# Patient Record
Sex: FEMALE | Race: WHITE | Employment: UNEMPLOYED | RURAL
[De-identification: names, ages, dates, MRNs, and addresses within clinical notes are randomized per-mention and may not be internally consistent; named-entity substitution may affect disease eponyms.]

---

## 2022-03-07 ENCOUNTER — OFFICE VISIT (OUTPATIENT)
Dept: FAMILY MEDICINE CLINIC | Age: 42
End: 2022-03-07
Payer: MEDICAID

## 2022-03-07 VITALS
DIASTOLIC BLOOD PRESSURE: 82 MMHG | HEIGHT: 67 IN | RESPIRATION RATE: 16 BRPM | SYSTOLIC BLOOD PRESSURE: 122 MMHG | HEART RATE: 64 BPM | BODY MASS INDEX: 31.92 KG/M2 | WEIGHT: 203.4 LBS | TEMPERATURE: 97.1 F

## 2022-03-07 DIAGNOSIS — E07.9 THYROID DISEASE: Primary | ICD-10-CM

## 2022-03-07 DIAGNOSIS — E55.9 VITAMIN D DEFICIENCY: ICD-10-CM

## 2022-03-07 DIAGNOSIS — B37.9 YEAST INFECTION: ICD-10-CM

## 2022-03-07 PROBLEM — K21.9 GASTROESOPHAGEAL REFLUX DISEASE WITHOUT ESOPHAGITIS: Status: ACTIVE | Noted: 2022-03-07

## 2022-03-07 PROCEDURE — 99203 OFFICE O/P NEW LOW 30 MIN: CPT | Performed by: FAMILY MEDICINE

## 2022-03-07 RX ORDER — FLUCONAZOLE 150 MG/1
150 TABLET ORAL DAILY
Qty: 1 TABLET | Refills: 0 | Status: SHIPPED | OUTPATIENT
Start: 2022-03-07 | End: 2022-03-08

## 2022-03-07 NOTE — PATIENT INSTRUCTIONS
Learning About Low-Carbohydrate Diets  What is a low-carbohydrate diet? A low-carbohydrate (or \"low-carb\") diet limits foods and drinks that have carbohydrates. This includes grains, fruits, milk and yogurt, and starchy vegetables like potatoes, beans, and corn. It also avoids foods and drinks that have added sugar. Instead, low-carb diets include foods that are high in protein and fat. Why might you follow a low-carb diet? Low-carb diets may be used for a variety of reasons, such as for weight loss. People who have diabetes may use a low-carb diet to help manage their blood sugar levels. What should you do before you start the diet? Talk to your doctor before you try any diet. This is even more important if you have health problems like kidney disease, heart disease, or diabetes. Your doctor may suggest that you meet with a registered dietitian. A dietitian can help you make an eating plan that works for you. What foods do you eat on a low-carb diet? On a low-carb diet, you choose foods that are high in protein and fat. Examples of these are:  · Meat, poultry, and fish. · Eggs. · Nuts, such as walnuts, pecans, almonds, and peanuts. · Peanut butter and other nut butters. · Tofu. · Avocado. · Erica Sidell. · Non-starchy vegetables like broccoli, cauliflower, green beans, mushrooms, peppers, lettuce, and spinach. · Unsweetened non-dairy milks like almond milk and coconut milk. · Cheese, cottage cheese, and cream cheese. Current as of: December 17, 2020               Content Version: 13.0  © 2006-2021 [employer]. Care instructions adapted under license by [employer] (which disclaims liability or warranty for this information). If you have questions about a medical condition or this instruction, always ask your healthcare professional. [place] [address] any warranty or liability for your use of this information.

## 2022-03-07 NOTE — PROGRESS NOTES
Chief Complaint   Patient presents with   [address]     3 most recent PHQ Screens 3/7/2022   Little interest or pleasure in doing things Not at all   Feeling down, depressed, irritable, or hopeless Not at all   Total Score PHQ 2 0     Abuse Screening Questionnaire 3/7/2022   Do you ever feel afraid of your partner? N   Are you in a relationship with someone who physically or mentally threatens you? N   Is it safe for you to go home? Y     Visit Vitals  /82 (BP 1 Location: Left arm, BP Patient Position: Sitting, BP Cuff Size: Small adult)   Pulse 64   Temp 97.1 °F (36.2 °C) (Oral)   Resp 16   Ht 5' 7\" (1.702 m)   Wt 203 lb 6.4 oz (92.3 kg)   BMI 31.86 kg/m²     1. \"Have you been to the ER, urgent care clinic since your last visit? Hospitalized since your last visit? \" 11/21 [address] for UTI    2. \"Have you seen or consulted any other health care providers outside of the [address] [place] [address] since your last visit? \" no    3. For patients aged 39-70: Has the patient had a colonoscopy / FIT/ Cologuard?no      If the patient is female:    4. For patients aged 41-77: Has the patient had a mammogram within the past 2 years? Paulette Phan      5. For patients aged 21-65: Has the patient had a pap smear?  GYN Pap Done

## 2022-03-08 ENCOUNTER — TELEPHONE (OUTPATIENT)
Dept: FAMILY MEDICINE CLINIC | Age: 42
End: 2022-03-08

## 2022-03-08 LAB
25(OH)D3+25(OH)D2 SERPL-MCNC: 22.2 NG/ML (ref 30–100)
T4 FREE SERPL-MCNC: 1.16 NG/DL (ref 0.82–1.77)
TSH SERPL DL<=0.005 MIU/L-ACNC: 0.69 UIU/ML (ref 0.45–4.5)

## 2022-03-08 NOTE — PROGRESS NOTES
HISTORY OF PRESENT ILLNESS  Demarcus Hester is a 38 y.o. female. HPI  New pt presents to establish care. Hx of thyroid nodules. Years ago she was treated with thyroid hormone replacement but she was able to get off med after pregnancy. Due for US thyroid. Pt had ER visit @ [address] [place] [address] Str. last 2021 for vaginal discharge. Had labs and CT scan. She has some recurrent vaginal itching. ROS  Past Medical History:   Diagnosis Date    Thyroid disease     hypo     Past Surgical History:   Procedure Laterality Date    HX  SECTION  2009    twins    HX TUBAL LIGATION       Current Outpatient Medications   Medication Sig Dispense Refill    fluconazole (DIFLUCAN) 150 mg tablet Take 1 Tablet by mouth daily for 1 day. FDA advises cautious prescribing of oral fluconazole in pregnancy. 1 Tablet 0     Social History     Socioeconomic History    Marital status:      Spouse name: Not on file    Number of children: Not on file    Years of education: Not on file    Highest education level: Not on file   Occupational History    Not on file   Tobacco Use    Smoking status: Never Smoker    Smokeless tobacco: Never Used   Vaping Use    Vaping Use: Never used   Substance and Sexual Activity    Alcohol use: Never    Drug use: Never    Sexual activity: Yes     Birth control/protection: Surgical   Other Topics Concern    Not on file   Social History Narrative    Not on file     Social Determinants of Health     Financial Resource Strain:     Difficulty of Paying Living Expenses: Not on file   Food Insecurity:     Worried About Running Out of Food in the Last Year: Not on file    [place] of Food in the Last Year: Not on file   Transportation Needs:     Lack of Transportation (Medical): Not on file    Lack of Transportation (Non-Medical):  Not on file   Physical Activity:     Days of Exercise per Week: Not on file    Minutes of Exercise per Session: Not on file   Stress:     Feeling of Stress : Not on file   Social Connections:     Frequency of Communication with Friends and Family: Not on file    Frequency of Social Gatherings with Friends and Family: Not on file    Attends Sikh Services: Not on file    Active Member of Clubs or Organizations: Not on file    Attends Club or Organization Meetings: Not on file    Marital Status: Not on file   Intimate Partner Violence:     Fear of Current or Ex-Partner: Not on file    Emotionally Abused: Not on file    Physically Abused: Not on file    Sexually Abused: Not on file   Housing Stability:     Unable to Pay for Housing in the Last Year: Not on file    Number of [place] in the Last Year: Not on file    Unstable Housing in the Last Year: Not on file     Family History   Problem Relation Age of Onset    Diabetes Mother     Heart Disease Mother         CAD    Hypertension Mother     Cancer Father         prostate    Other Father         CLL    Hypertension Brother      Visit Vitals  /82 (BP 1 Location: Left arm, BP Patient Position: Sitting, BP Cuff Size: Small adult)   Pulse 64   Temp 97.1 °F (36.2 °C) (Oral)   Resp 16   Ht 5' 7\" (1.702 m)   Wt 203 lb 6.4 oz (92.3 kg)   LMP 02/21/2022   BMI 31.86 kg/m²       Physical Exam  Vitals reviewed. Constitutional:       Appearance: Normal appearance. HENT:      Head: Normocephalic. Cardiovascular:      Rate and Rhythm: Normal rate and regular rhythm. Heart sounds: Normal heart sounds. Pulmonary:      Effort: Pulmonary effort is normal.      Breath sounds: Normal breath sounds. Musculoskeletal:      Cervical back: Neck supple. No tenderness. Neurological:      Mental Status: She is alert. ASSESSMENT and PLAN    ICD-10-CM ICD-9-CM    1. Thyroid disease  E07.9 246.9 US THYROID/PARATHYROID/SOFT TISS      TSH 3RD GENERATION      T4, FREE      TSH 3RD GENERATION      T4, FREE   2. Yeast infection  B37.9 112.9 fluconazole (DIFLUCAN) 150 mg tablet   3.  Vitamin D deficiency E55.9 268.9 VITAMIN D, 25 HYDROXY      VITAMIN D, 25 HYDROXY     Request ER records from [facility] (Nov 2021)  Check thyroid labs.   Order US thyroid

## 2022-03-08 NOTE — TELEPHONE ENCOUNTER
----- Message from Juli Mallory sent at 3/8/2022  9:56 AM EST -----  Subject: Message to Provider    QUESTIONS  Information for Provider? Pt called and said that the imaging of her   thyroid was completed at the [facility]   [phone]. She wanted to know if the office could request those   records. ---------------------------------------------------------------------------  --------------  Lina Fearing INFO  What is the best way for the office to contact you? OK to leave message on   voicemail  Preferred Call Back Phone Number? [phone]  ---------------------------------------------------------------------------  --------------  SCRIPT ANSWERS  Relationship to Patient?  Self

## 2022-03-08 NOTE — TELEPHONE ENCOUNTER
Spoke to patient let her know she needs to reach out to Imaging office in [place] and have them faxed to our office.

## 2022-03-10 ENCOUNTER — HOSPITAL ENCOUNTER (OUTPATIENT)
Dept: ULTRASOUND IMAGING | Age: 42
Discharge: HOME | End: 2022-03-10
Attending: FAMILY MEDICINE
Payer: MEDICAID

## 2022-03-10 ENCOUNTER — TELEPHONE (OUTPATIENT)
Dept: FAMILY MEDICINE CLINIC | Age: 42
End: 2022-03-10

## 2022-03-10 DIAGNOSIS — E07.9 THYROID DISEASE: ICD-10-CM

## 2022-03-10 PROCEDURE — 76536 US EXAM OF HEAD AND NECK: CPT

## 2022-03-10 NOTE — TELEPHONE ENCOUNTER
[facility] said that pt has not been seen there. Called pt to check where her last PAP and mammogram were done. Lvm for her to call back with the information.

## 2022-03-13 NOTE — PROGRESS NOTES
Thyroid nodules do not look worrisome. We were able to get record of thyroid ultrasound from previous clinic.

## 2022-03-19 PROBLEM — K21.9 GASTROESOPHAGEAL REFLUX DISEASE WITHOUT ESOPHAGITIS: Status: ACTIVE | Noted: 2022-03-07

## 2022-04-04 ENCOUNTER — TELEPHONE (OUTPATIENT)
Dept: FAMILY MEDICINE CLINIC | Age: 42
End: 2022-04-04

## 2022-04-04 DIAGNOSIS — R53.83 FATIGUE, UNSPECIFIED TYPE: Primary | ICD-10-CM

## 2022-04-04 NOTE — TELEPHONE ENCOUNTER
----- Message from Darin Daniels sent at 4/4/2022  9:22 AM EDT -----  Subject: Message to Provider    QUESTIONS  Information for Provider? patient would like some lab orders put in for   her please. Please let patient know when this is done. Thanks  ---------------------------------------------------------------------------  --------------  Ruy SAMUEL  What is the best way for the office to contact you? OK to leave message on   voicemail  Preferred Call Back Phone Number? [phone]  ---------------------------------------------------------------------------  --------------  SCRIPT ANSWERS  Relationship to Patient?  Self

## 2022-04-04 NOTE — TELEPHONE ENCOUNTER
Spoke to patient. Patient is wanting a CBC or BMP ordered because she is tired with white gums and feels like she may be anemic. I explained that Dr. Maryam Maddox is on vacation and it may be a while before we get back to her and she understood. Thank you.

## 2022-04-06 NOTE — TELEPHONE ENCOUNTER
I placed lab orders. Please call pt to set lab appt. Her Vit D was low last month and that can cause fatigue. I had advised to take Vit D supplement.

## 2022-04-12 ENCOUNTER — APPOINTMENT (OUTPATIENT)
Dept: FAMILY MEDICINE CLINIC | Age: 42
End: 2022-04-12

## 2022-04-13 LAB
25(OH)D3+25(OH)D2 SERPL-MCNC: 36.7 NG/ML (ref 30–100)
ALBUMIN SERPL-MCNC: 4.9 G/DL (ref 3.8–4.8)
ALBUMIN/GLOB SERPL: 1.7 {RATIO} (ref 1.2–2.2)
ALP SERPL-CCNC: 80 IU/L (ref 44–121)
ALT SERPL-CCNC: 11 IU/L (ref 0–32)
AST SERPL-CCNC: 12 IU/L (ref 0–40)
BASOPHILS # BLD AUTO: 0 X10E3/UL (ref 0–0.2)
BASOPHILS NFR BLD AUTO: 1 %
BILIRUB SERPL-MCNC: 0.7 MG/DL (ref 0–1.2)
BUN SERPL-MCNC: 12 MG/DL (ref 6–24)
BUN/CREAT SERPL: 16 (ref 9–23)
CALCIUM SERPL-MCNC: 9 MG/DL (ref 8.7–10.2)
CHLORIDE SERPL-SCNC: 103 MMOL/L (ref 96–106)
CO2 SERPL-SCNC: 23 MMOL/L (ref 20–29)
CREAT SERPL-MCNC: 0.77 MG/DL (ref 0.57–1)
EGFR: 97 ML/MIN/1.73
EOSINOPHIL # BLD AUTO: 0 X10E3/UL (ref 0–0.4)
EOSINOPHIL NFR BLD AUTO: 1 %
ERYTHROCYTE [DISTWIDTH] IN BLOOD BY AUTOMATED COUNT: 12.3 % (ref 11.7–15.4)
GLOBULIN SER CALC-MCNC: 2.9 G/DL (ref 1.5–4.5)
GLUCOSE SERPL-MCNC: 81 MG/DL (ref 65–99)
HCT VFR BLD AUTO: 42.9 % (ref 34–46.6)
HGB BLD-MCNC: 14.1 G/DL (ref 11.1–15.9)
IMM GRANULOCYTES # BLD AUTO: 0 X10E3/UL (ref 0–0.1)
IMM GRANULOCYTES NFR BLD AUTO: 0 %
LYMPHOCYTES # BLD AUTO: 1.6 X10E3/UL (ref 0.7–3.1)
LYMPHOCYTES NFR BLD AUTO: 30 %
MCH RBC QN AUTO: 29 PG (ref 26.6–33)
MCHC RBC AUTO-ENTMCNC: 32.9 G/DL (ref 31.5–35.7)
MCV RBC AUTO: 88 FL (ref 79–97)
MONOCYTES # BLD AUTO: 0.4 X10E3/UL (ref 0.1–0.9)
MONOCYTES NFR BLD AUTO: 8 %
NEUTROPHILS # BLD AUTO: 3.3 X10E3/UL (ref 1.4–7)
NEUTROPHILS NFR BLD AUTO: 60 %
PLATELET # BLD AUTO: 292 X10E3/UL (ref 150–450)
POTASSIUM SERPL-SCNC: 4.2 MMOL/L (ref 3.5–5.2)
PROT SERPL-MCNC: 7.8 G/DL (ref 6–8.5)
RBC # BLD AUTO: 4.87 X10E6/UL (ref 3.77–5.28)
SODIUM SERPL-SCNC: 143 MMOL/L (ref 134–144)
WBC # BLD AUTO: 5.5 X10E3/UL (ref 3.4–10.8)

## 2022-04-15 NOTE — TELEPHONE ENCOUNTER
Labs show normal complete blood cell counts. Normal sugar, electrolytes, liver, and kidney function. Vit D level is at goal.  Keep up the good work!

## 2022-06-29 ENCOUNTER — OFFICE VISIT (OUTPATIENT)
Dept: FAMILY MEDICINE CLINIC | Age: 42
End: 2022-06-29
Payer: COMMERCIAL

## 2022-06-29 VITALS
HEART RATE: 77 BPM | RESPIRATION RATE: 20 BRPM | SYSTOLIC BLOOD PRESSURE: 110 MMHG | OXYGEN SATURATION: 100 % | WEIGHT: 239 LBS | DIASTOLIC BLOOD PRESSURE: 80 MMHG | BODY MASS INDEX: 34.22 KG/M2 | TEMPERATURE: 98.5 F | HEIGHT: 70 IN

## 2022-06-29 DIAGNOSIS — F32.9 DEPRESSION, REACTIVE: ICD-10-CM

## 2022-06-29 DIAGNOSIS — Z63.0 STRESS DUE TO MARITAL PROBLEMS: ICD-10-CM
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
## 2022-06-29 DIAGNOSIS — R11.0 NAUSEA: Primary | ICD-10-CM

## 2022-06-29 PROCEDURE — 99214 OFFICE O/P EST MOD 30 MIN: CPT | Performed by: FAMILY MEDICINE

## 2022-06-29 RX ORDER — FAMOTIDINE 40 MG/1
40 TABLET, FILM COATED ORAL DAILY
Qty: 30 TABLET | Refills: 0 | Status: SHIPPED | OUTPATIENT
Start: 2022-06-29 | End: 2022-08-12

## 2022-06-29 RX ORDER — ASPIRIN 325 MG
TABLET, DELAYED RELEASE (ENTERIC COATED) ORAL
COMMUNITY
Start: 2022-05-19

## 2022-06-29 SDOH — SOCIAL STABILITY - SOCIAL INSECURITY: PROBLEMS IN RELATIONSHIP WITH SPOUSE OR PARTNER: Z63.0

## 2022-06-29 NOTE — PATIENT INSTRUCTIONS
Exposure to Blood and Body Fluids: Care Instructions  Your Care Instructions     When you are caring for another person, there's always a chance that you might be exposed to the person's body fluids, such as blood, saliva, urine, or vomit. This can happen if you're accidently stuck by a needle or if body fluids splash into an open cut, or into your mouth, nose, or eyes. You can also be exposed if someone coughs or sneezes near your mouth or eyes. When something like this happens, it can be scary. The biggest concern is getting a disease. You may need repeated tests to check for hepatitis B, hepatitis C, and HIV infection. You may need other tests too. The first tests may not show any infection, but your doctor will need them to compare with later tests. Be sure to talk with your doctor about follow-up tests. You may need tests at 6 weeks, 3 months, 6 months, and possibly at 9 months. It takes this long for some diseases to show up on tests. Follow-up care is a key part of your treatment and safety. Be sure to make and go to all appointments, and call your doctor if you are having problems. It's also a good idea to know your test results and keep a list of the medicines you take. How can you care for yourself at home? · Clean the area as instructed by your doctor. · Be safe with medicines. If your doctor prescribed medicine to protect you from disease, make sure you take all the medicine as directed. Call your doctor if you think you are having a problem with your medicine. When should you call for help? Watch closely for changes in your health, and be sure to contact your doctor if:    · You have symptoms of infection, such as:  ? Increased pain, swelling, warmth or redness. ? Red streaks leading from the area. ? Pus draining from the area. ? A fever.     · You have new symptoms, such as belly pain or fatigue. Where can you learn more?   Go to http://www.gray.com/  Enter C142 in the search box to learn more about \"Exposure to Blood and Body Fluids: Care Instructions. \"  Current as of: July 1, 2021               Content Version: 13.2  © 8096-4992 [employer]. Care instructions adapted under license by [employer] (which disclaims liability or warranty for this information). If you have questions about a medical condition or this instruction, always ask your healthcare professional. [place] [address] any warranty or liability for your use of this information.

## 2022-06-29 NOTE — PROGRESS NOTES
Identified pt with two pt identifiers(name and ). Chief Complaint   Patient presents with    Nausea    Stress        Health Maintenance Due   Topic    Hepatitis C Screening     COVID-19 Vaccine (1)    DTaP/Tdap/Td series (1 - Tdap)    Cervical cancer screen     Lipid Screen        Wt Readings from Last 3 Encounters:   22 239 lb (108.4 kg)   22 203 lb 6.4 oz (92.3 kg)     Temp Readings from Last 3 Encounters:   22 98.5 °F (36.9 °C) (Temporal)   22 97.1 °F (36.2 °C) (Oral)     BP Readings from Last 3 Encounters:   22 110/80   22 122/82     Pulse Readings from Last 3 Encounters:   22 77   22 64         Learning Assessment:  :     Learning Assessment 3/7/2022   PRIMARY LEARNER Patient   CO-LEARNER CAREGIVER No   PRIMARY LANGUAGE ENGLISH   LEARNER PREFERENCE PRIMARY LISTENING   ANSWERED BY patient   RELATIONSHIP SELF       Depression Screening:  :     3 most recent PHQ Screens 2022   Little interest or pleasure in doing things Not at all   Feeling down, depressed, irritable, or hopeless Not at all   Total Score PHQ 2 0       Fall Risk Assessment:  :     No flowsheet data found. Abuse Screening:  :     Abuse Screening Questionnaire 2022 3/7/2022   Do you ever feel afraid of your partner? N N   Are you in a relationship with someone who physically or mentally threatens you? N N   Is it safe for you to go home? Y Y       Coordination of Care Questionnaire:  :     1) Have you been to an emergency room, urgent care clinic since your last visit? no   Hospitalized since your last visit? no             2) Have you seen or consulted any other health care providers outside of [address] [place] [address] since your last visit? no  (Include any pap smears or colon screenings in this section.)    3) Do you have an Advance Directive on file?  no  Are you interested in receiving information about Advance Directives? no    Patient is accompanied by N/A I have received verbal consent from [facility] A[facility] REA to discuss any/all medical information while they are present in the room. 4.  For patients aged 39-70: Has the patient had a colonoscopy / FIT/ Cologuard? NA - based on age      If the patient is female:    11. For patients aged 41-77: Has the patient had a mammogram within the past 2 years? Yes - no Care Gap present      6. For patients aged 21-65: Has the patient had a pap smear?  Yes - no Care Gap present

## 2022-06-29 NOTE — PROGRESS NOTES
HISTORY OF PRESENT ILLNESS  Bridgett Ordaz is a 38 y.o. female. HPI  C/O  \"nervous stomach\" since early June. Off and on. Feels nausea. No vomiting. Appetite is off. Hx IBS. No pain. Normal stools. If gets better when she is not under stress, such as when she took kids to beach for few days. Under a lot of stress. Having marital problems. This is affecting her work. Spoke with manager. Last worked June 9, 2022. She was crying between patients. She was working at [employer]. She needs [employer] papers filled out. Would like to work only 2-3 days per week due to stress. She is not interested in medical therapy at this time for anxiety. Will consider therapy. 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 Unable to track down patient involved. Now she is anxious about this and would like labs run. Review of Systems   Gastrointestinal: Positive for nausea. Negative for abdominal pain, constipation and diarrhea. Psychiatric/Behavioral: Positive for depression. The patient is nervous/anxious. Visit Vitals  /80 (BP 1 Location: Left arm, BP Patient Position: Sitting, BP Cuff Size: Adult)   Pulse 77   Temp 98.5 °F (36.9 °C) (Temporal)   Resp 20   Ht 5' 10\" (1.778 m)   Wt 239 lb (108.4 kg)   LMP 06/21/2022   SpO2 100%   BMI 34.29 kg/m²       Physical Exam  Vitals reviewed. Constitutional:       Appearance: She is obese. Neurological:      Mental Status: She is alert. Psychiatric:         Attention and Perception: Attention normal.         Mood and Affect: Mood is depressed. Speech: Speech normal.         Behavior: Behavior normal.         Thought Content: Thought content normal.         ASSESSMENT and PLAN    ICD-10-CM ICD-9-CM    1. Nausea  R11.0 787.02 famotidine (PEPCID) 40 mg tablet   2.  Depression, reactive  F32.9 300.4 REFERRAL TO BEHAVIORAL HEALTH   3. Stress due to marital problems  Z63.0 V61.10 REFERRAL TO BEHAVIORAL HEALTH   4. Needle stick, hypodermic, accidental, subsequent encounter  O29. 0XXD V58.89 HEPATITIS PANEL, ACUTE      HIV 1/2 AG/AB, 4TH GENERATION,W RFLX CONFIRM      HEPATITIS PANEL, ACUTE      HIV 1/2 AG/AB, 4TH GENERATION,W RFLX CONFIRM     Trial Pepcid 40 mg daily. Refer for counseling - call [facility] [employer]. Order labs:  HIV, Hepatitis screen  She will call HR to send FMLA forms sent here for completion. Plan to stay off work 4-6 weeks.   FU 1 month

## 2022-07-01 ENCOUNTER — APPOINTMENT (OUTPATIENT)
Dept: FAMILY MEDICINE CLINIC | Age: 42
End: 2022-07-01

## 2022-07-02 LAB
HAV IGM SERPL QL IA: NEGATIVE
HBV CORE IGM SERPL QL IA: NEGATIVE
HBV SURFACE AG SERPL QL IA: NEGATIVE
HCV AB S/CO SERPL IA: 0.1 S/CO RATIO (ref 0–0.9)
HCV AB SERPL QL IA: NORMAL
HIV 1+2 AB+HIV1 P24 AG SERPL QL IA: NON REACTIVE

## 2022-07-05 ENCOUNTER — TELEPHONE (OUTPATIENT)
Dept: FAMILY MEDICINE CLINIC | Age: 42
End: 2022-07-05

## 2022-07-06 NOTE — TELEPHONE ENCOUNTER
Call pt re: [facility] Accommodation Request Form. This is different from [facility] form we talked about. Has she found counselor? Also need FU appt end of July.

## 2022-07-08 NOTE — TELEPHONE ENCOUNTER
I spoke with pt. Her manager told her she was not eligible for FMLA since not worked for a year. Form sent to me is for ADA accomodation. Pt has gone back to work, afraid to lose job. She does not want me to fax the [facility] accomodation request form. She has applied for job transfer and will work only 2-3 days a week.

## 2022-08-11 DIAGNOSIS — R11.0 NAUSEA: ICD-10-CM

## 2022-08-12 RX ORDER — FAMOTIDINE 40 MG/1
40 TABLET, FILM COATED ORAL DAILY
Qty: 30 TABLET | Refills: 2 | Status: SHIPPED | OUTPATIENT
Start: 2022-08-12

## 2022-12-19 ENCOUNTER — NURSE TRIAGE (OUTPATIENT)
Dept: OTHER | Facility: CLINIC | Age: 42
End: 2022-12-19

## 2023-01-23 ENCOUNTER — TRANSCRIBE ORDER (OUTPATIENT)
Dept: SCHEDULING | Age: 43
End: 2023-01-23

## 2023-01-23 DIAGNOSIS — M54.50 LUMBAGO: ICD-10-CM

## 2023-01-23 DIAGNOSIS — M54.2 CERVICALGIA: Primary | ICD-10-CM

## 2023-02-09 ENCOUNTER — HOSPITAL ENCOUNTER (OUTPATIENT)
Dept: MRI IMAGING | Age: 43
End: 2023-02-09
Attending: ORTHOPAEDIC SURGERY
Payer: COMMERCIAL

## 2023-02-09 ENCOUNTER — HOSPITAL ENCOUNTER (OUTPATIENT)
Dept: MRI IMAGING | Age: 43
Discharge: HOME | End: 2023-02-09
Attending: ORTHOPAEDIC SURGERY
Payer: COMMERCIAL

## 2023-02-09 DIAGNOSIS — M54.50 LUMBAGO: ICD-10-CM

## 2023-02-09 PROCEDURE — 72148 MRI LUMBAR SPINE W/O DYE: CPT

## 2023-04-22 DIAGNOSIS — M54.2 CERVICALGIA: Primary | ICD-10-CM

## 2024-02-21 ENCOUNTER — OFFICE VISIT (OUTPATIENT)
Age: 44
End: 2024-02-21
Payer: MEDICAID

## 2024-02-21 VITALS
HEIGHT: 67 IN | HEART RATE: 66 BPM | DIASTOLIC BLOOD PRESSURE: 62 MMHG | WEIGHT: 179 LBS | BODY MASS INDEX: 28.09 KG/M2 | SYSTOLIC BLOOD PRESSURE: 110 MMHG | RESPIRATION RATE: 16 BRPM | OXYGEN SATURATION: 99 % | TEMPERATURE: 97.6 F

## 2024-02-21 DIAGNOSIS — E04.2 MULTIPLE THYROID NODULES: ICD-10-CM

## 2024-02-21 DIAGNOSIS — Z13.220 SCREENING, LIPID: ICD-10-CM

## 2024-02-21 DIAGNOSIS — Z12.31 ENCOUNTER FOR SCREENING MAMMOGRAM FOR MALIGNANT NEOPLASM OF BREAST: ICD-10-CM

## 2024-02-21 DIAGNOSIS — Z01.419 WELL FEMALE EXAM WITH ROUTINE GYNECOLOGICAL EXAM: ICD-10-CM

## 2024-02-21 DIAGNOSIS — E55.9 VITAMIN D DEFICIENCY: ICD-10-CM

## 2024-02-21 DIAGNOSIS — E07.9 THYROID DISEASE: ICD-10-CM

## 2024-02-21 DIAGNOSIS — Z79.899 ENCOUNTER FOR LONG-TERM (CURRENT) USE OF MEDICATIONS: ICD-10-CM

## 2024-02-21 DIAGNOSIS — Z12.83 SKIN CANCER SCREENING: ICD-10-CM

## 2024-02-21 DIAGNOSIS — Z00.00 ENCOUNTER FOR WELL ADULT EXAM WITHOUT ABNORMAL FINDINGS: Primary | ICD-10-CM

## 2024-02-21 DIAGNOSIS — Z12.11 SCREEN FOR COLON CANCER: ICD-10-CM

## 2024-02-21 DIAGNOSIS — K21.9 GASTROESOPHAGEAL REFLUX DISEASE WITHOUT ESOPHAGITIS: ICD-10-CM

## 2024-02-21 PROCEDURE — 99214 OFFICE O/P EST MOD 30 MIN: CPT | Performed by: FAMILY MEDICINE

## 2024-02-21 RX ORDER — FAMOTIDINE 40 MG/1
40 TABLET, FILM COATED ORAL 2 TIMES DAILY
Qty: 60 TABLET | Refills: 5 | Status: SHIPPED | OUTPATIENT
Start: 2024-02-21

## 2024-02-21 RX ORDER — PANTOPRAZOLE SODIUM 40 MG/1
40 TABLET, DELAYED RELEASE ORAL DAILY
COMMUNITY
End: 2024-02-21

## 2024-02-21 SDOH — ECONOMIC STABILITY: HOUSING INSECURITY
IN THE LAST 12 MONTHS, WAS THERE A TIME WHEN YOU DID NOT HAVE A STEADY PLACE TO SLEEP OR SLEPT IN A SHELTER (INCLUDING NOW)?: NO

## 2024-02-21 SDOH — ECONOMIC STABILITY: FOOD INSECURITY: WITHIN THE PAST 12 MONTHS, THE FOOD YOU BOUGHT JUST DIDN'T LAST AND YOU DIDN'T HAVE MONEY TO GET MORE.: NEVER TRUE

## 2024-02-21 SDOH — ECONOMIC STABILITY: INCOME INSECURITY: HOW HARD IS IT FOR YOU TO PAY FOR THE VERY BASICS LIKE FOOD, HOUSING, MEDICAL CARE, AND HEATING?: NOT HARD AT ALL

## 2024-02-21 SDOH — ECONOMIC STABILITY: FOOD INSECURITY: WITHIN THE PAST 12 MONTHS, YOU WORRIED THAT YOUR FOOD WOULD RUN OUT BEFORE YOU GOT MONEY TO BUY MORE.: NEVER TRUE

## 2024-02-21 ASSESSMENT — PATIENT HEALTH QUESTIONNAIRE - PHQ9
SUM OF ALL RESPONSES TO PHQ QUESTIONS 1-9: 0
7. TROUBLE CONCENTRATING ON THINGS, SUCH AS READING THE NEWSPAPER OR WATCHING TELEVISION: 0
3. TROUBLE FALLING OR STAYING ASLEEP: 0
2. FEELING DOWN, DEPRESSED OR HOPELESS: 0
SUM OF ALL RESPONSES TO PHQ QUESTIONS 1-9: 0
SUM OF ALL RESPONSES TO PHQ9 QUESTIONS 1 & 2: 0
SUM OF ALL RESPONSES TO PHQ QUESTIONS 1-9: 0
6. FEELING BAD ABOUT YOURSELF - OR THAT YOU ARE A FAILURE OR HAVE LET YOURSELF OR YOUR FAMILY DOWN: 0
SUM OF ALL RESPONSES TO PHQ QUESTIONS 1-9: 0
8. MOVING OR SPEAKING SO SLOWLY THAT OTHER PEOPLE COULD HAVE NOTICED. OR THE OPPOSITE, BEING SO FIGETY OR RESTLESS THAT YOU HAVE BEEN MOVING AROUND A LOT MORE THAN USUAL: 0
4. FEELING TIRED OR HAVING LITTLE ENERGY: 0
9. THOUGHTS THAT YOU WOULD BE BETTER OFF DEAD, OR OF HURTING YOURSELF: 0
5. POOR APPETITE OR OVEREATING: 0
1. LITTLE INTEREST OR PLEASURE IN DOING THINGS: 0
10. IF YOU CHECKED OFF ANY PROBLEMS, HOW DIFFICULT HAVE THESE PROBLEMS MADE IT FOR YOU TO DO YOUR WORK, TAKE CARE OF THINGS AT HOME, OR GET ALONG WITH OTHER PEOPLE: 0

## 2024-02-21 NOTE — PROGRESS NOTES
Well Adult Note  Name: Poppy Lewis Today’s Date: 2024   MRN: [id number] Sex: Female   Age: 44 y.o. Ethnicity:  /    : 1980 Race: White (non-)      Poppy Lewis is here for well adult exam.  History:  Hx GERD and thyroid nodules. Due labs.      Review of Systems    No Known Allergies      Prior to Visit Medications    Medication Sig Taking? Authorizing Provider   famotidine (PEPCID) 40 MG tablet Take 1 tablet by mouth 2 times daily Yes Yessenia Mijares MD         Past Medical History:   Diagnosis Date    Thyroid disease     hypo       Past Surgical History:   Procedure Laterality Date     SECTION  2009    twins    TUBAL LIGATION  2009         Family History   Problem Relation Age of Onset    Hypertension Brother     Other Father         CLL    Cancer Father         prostate    Hypertension Mother     Heart Disease Mother         CAD    Diabetes Mother        Social History     Tobacco Use    Smoking status: Never    Smokeless tobacco: Never   Substance Use Topics    Alcohol use: Never    Drug use: Never       Objective     Vital Signs  /62 (Site: Left Upper Arm, Position: Sitting, Cuff Size: Large Adult)   Pulse 66   Temp 97.6 °F (36.4 °C) (Temporal)   Resp 16   Ht 1.702 m (5' 7\")   Wt 81.2 kg (179 lb)   LMP 2024 (Approximate)   SpO2 99%   BMI 28.04 kg/m²   Wt Readings from Last 3 Encounters:   24 81.2 kg (179 lb)   22 108.4 kg (239 lb)   22 92.3 kg (203 lb 6.4 oz)       Waist Circumference  There were no vitals filed for this visit.    Physical Exam  Vitals reviewed.   Constitutional:       Appearance: Normal appearance.   HENT:      Head: Normocephalic.      Right Ear: Tympanic membrane normal.      Left Ear: Tympanic membrane normal.      Mouth/Throat:      Mouth: Mucous membranes are moist.   Eyes:      Conjunctiva/sclera: Conjunctivae normal.   Cardiovascular:      Rate and Rhythm: Normal rate and regular rhythm.      Heart

## 2024-02-21 NOTE — PATIENT INSTRUCTIONS
are likely to live. Your doctor may be able to give you an idea of what usually happens with your specific illness.  Think about preparing papers that state your wishes. These papers are called advance directives. If you do this early and review them often, there will not be any confusion about what you want. You can change your instructions at any time.  Which papers should you prepare?  Advance directives are legal papers that tell doctors how you want to be cared for at the end of your life. You do not need a  to write these papers. Ask your doctor or your [facility] department for information on how to write your advance directives. They may have the forms for each of these types of papers. Make sure your doctor has a copy of these on file, and give a copy to a family member or close friend.  Consider a do-not-resuscitate order (DNR). This order asks that no extra treatments be done if your heart stops or you stop breathing. Extra treatments may include cardiopulmonary resuscitation (CPR), electrical shock to restart your heart, or a machine to breathe for you. If you decide to have a DNR order, ask your doctor to explain and write it. Place the order in your home where everyone can easily see it.  Consider a living will. A living will explains your wishes about life support and other treatments at the end of your life if you become unable to speak for yourself. Living cho tell doctors to use or not use treatments that would keep you alive. You must have one or two witnesses or a notary present when you sign this form. A living will may be called something else in your state.  Consider a medical power of . This form allows you to name a person to make decisions about your care if you are not able to. Most people ask a close friend or family member. Talk to this person about the kinds of treatments you want and those that you do not want. Make sure this person understands your wishes. A medical

## 2024-02-21 NOTE — PROGRESS NOTES
Identified pt with two pt identifiers(name and ).    Chief Complaint   Patient presents with    Annual Exam     No concerns    Referral - General     OBGYN        Health Maintenance Due   Topic    Hepatitis B vaccine (1 of 3 - 3-dose series)    DTaP/Tdap/Td vaccine (1 - Tdap)    Cervical cancer screen     Lipids     Colorectal Cancer Screen     Depression Monitoring     Flu vaccine (1)    COVID-19 Vaccine ( season)       Wt Readings from Last 3 Encounters:   24 81.2 kg (179 lb)   22 108.4 kg (239 lb)   22 92.3 kg (203 lb 6.4 oz)     Temp Readings from Last 3 Encounters:   24 97.6 °F (36.4 °C) (Temporal)     BP Readings from Last 3 Encounters:   24 110/62   22 110/80   22 122/82     Pulse Readings from Last 3 Encounters:   24 66   22 77   22 64           Depression Screening:  :         2024     3:18 PM 2022     4:00 PM   PHQ-9 Questionaire   Little interest or pleasure in doing things 0 0   Feeling down, depressed, or hopeless 0 0   Trouble falling or staying asleep, or sleeping too much 0    Feeling tired or having little energy 0    Poor appetite or overeating 0    Feeling bad about yourself - or that you are a failure or have let yourself or your family down 0    Trouble concentrating on things, such as reading the newspaper or watching television 0    Moving or speaking so slowly that other people could have noticed. Or the opposite - being so fidgety or restless that you have been moving around a lot more than usual 0    Thoughts that you would be better off dead, or of hurting yourself in some way 0    PHQ-9 Total Score 0 0   If you checked off any problems, how difficult have these problems made it for you to do your work, take care of things at home, or get along with other people? 0         Fall Risk Assessment:  :          No data to display                 Abuse Screening:  :          No data to display

## 2024-02-27 ENCOUNTER — TELEPHONE (OUTPATIENT)
Age: 44
End: 2024-02-27

## 2024-03-08 ENCOUNTER — HOSPITAL ENCOUNTER (OUTPATIENT)
Facility: HOSPITAL | Age: 44
End: 2024-03-08
Attending: FAMILY MEDICINE
Payer: MEDICAID

## 2024-03-08 VITALS — HEIGHT: 67 IN | BODY MASS INDEX: 28.09 KG/M2 | WEIGHT: 179 LBS

## 2024-03-08 DIAGNOSIS — Z12.31 ENCOUNTER FOR SCREENING MAMMOGRAM FOR MALIGNANT NEOPLASM OF BREAST: ICD-10-CM

## 2024-03-08 DIAGNOSIS — E04.2 MULTIPLE THYROID NODULES: ICD-10-CM

## 2024-03-08 PROCEDURE — 77063 BREAST TOMOSYNTHESIS BI: CPT

## 2024-03-08 PROCEDURE — 76536 US EXAM OF HEAD AND NECK: CPT

## 2024-03-09 LAB
25(OH)D3+25(OH)D2 SERPL-MCNC: 22.2 NG/ML (ref 30–100)
ALBUMIN SERPL-MCNC: 4.3 G/DL (ref 3.9–4.9)
ALBUMIN/GLOB SERPL: 1.8 {RATIO} (ref 1.2–2.2)
ALP SERPL-CCNC: 57 IU/L (ref 44–121)
ALT SERPL-CCNC: 22 IU/L (ref 0–32)
AST SERPL-CCNC: 22 IU/L (ref 0–40)
BASOPHILS # BLD AUTO: 0 X10E3/UL (ref 0–0.2)
BASOPHILS NFR BLD AUTO: 0 %
BILIRUB SERPL-MCNC: 0.9 MG/DL (ref 0–1.2)
BUN SERPL-MCNC: 11 MG/DL (ref 6–24)
BUN/CREAT SERPL: 14 (ref 9–23)
CALCIUM SERPL-MCNC: 9 MG/DL (ref 8.7–10.2)
CHLORIDE SERPL-SCNC: 103 MMOL/L (ref 96–106)
CHOLEST SERPL-MCNC: 167 MG/DL (ref 100–199)
CO2 SERPL-SCNC: 22 MMOL/L (ref 20–29)
CREAT SERPL-MCNC: 0.81 MG/DL (ref 0.57–1)
EGFRCR SERPLBLD CKD-EPI 2021: 91 ML/MIN/1.73
EOSINOPHIL # BLD AUTO: 0.1 X10E3/UL (ref 0–0.4)
EOSINOPHIL NFR BLD AUTO: 1 %
ERYTHROCYTE [DISTWIDTH] IN BLOOD BY AUTOMATED COUNT: 12.3 % (ref 11.7–15.4)
GLOBULIN SER CALC-MCNC: 2.4 G/DL (ref 1.5–4.5)
GLUCOSE SERPL-MCNC: 77 MG/DL (ref 70–99)
HCT VFR BLD AUTO: 36.7 % (ref 34–46.6)
HDLC SERPL-MCNC: 70 MG/DL
HGB BLD-MCNC: 12 G/DL (ref 11.1–15.9)
IMM GRANULOCYTES # BLD AUTO: 0 X10E3/UL (ref 0–0.1)
IMM GRANULOCYTES NFR BLD AUTO: 0 %
IMP & REVIEW OF LAB RESULTS: NORMAL
LDLC SERPL CALC-MCNC: 86 MG/DL (ref 0–99)
LYMPHOCYTES # BLD AUTO: 1.5 X10E3/UL (ref 0.7–3.1)
LYMPHOCYTES NFR BLD AUTO: 21 %
MCH RBC QN AUTO: 28.8 PG (ref 26.6–33)
MCHC RBC AUTO-ENTMCNC: 32.7 G/DL (ref 31.5–35.7)
MCV RBC AUTO: 88 FL (ref 79–97)
MONOCYTES # BLD AUTO: 0.4 X10E3/UL (ref 0.1–0.9)
MONOCYTES NFR BLD AUTO: 6 %
NEUTROPHILS # BLD AUTO: 5 X10E3/UL (ref 1.4–7)
NEUTROPHILS NFR BLD AUTO: 72 %
PLATELET # BLD AUTO: 280 X10E3/UL (ref 150–450)
POTASSIUM SERPL-SCNC: 4.2 MMOL/L (ref 3.5–5.2)
PROT SERPL-MCNC: 6.7 G/DL (ref 6–8.5)
RBC # BLD AUTO: 4.17 X10E6/UL (ref 3.77–5.28)
SODIUM SERPL-SCNC: 140 MMOL/L (ref 134–144)
T4 FREE SERPL-MCNC: 1.19 NG/DL (ref 0.82–1.77)
TRIGL SERPL-MCNC: 52 MG/DL (ref 0–149)
TSH SERPL DL<=0.005 MIU/L-ACNC: 0.75 UIU/ML (ref 0.45–4.5)
VLDLC SERPL CALC-MCNC: 11 MG/DL (ref 5–40)
WBC # BLD AUTO: 7 X10E3/UL (ref 3.4–10.8)

## 2024-03-11 ENCOUNTER — TELEPHONE (OUTPATIENT)
Age: 44
End: 2024-03-11

## 2024-03-11 NOTE — TELEPHONE ENCOUNTER
----- Message from Yessenia Mijares MD sent at 3/9/2024  9:07 PM EST -----  Normal mammogram report.

## 2024-03-12 NOTE — PROGRESS NOTES
Poppy Lewis is a 44 y.o. female returns for an annual exam     Chief Complaint   Patient presents with    Annual Exam    New Patient       Patient's last menstrual period was 03/09/2024 (exact date).  Her periods are moderate in flow and usually regular with a 26-32 day interval with 3-7 day duration.  She does not have dysmenorrhea.  Problems: no problems  Birth Control: tubal ligation and not S/A currently  Last Pap: 2022 was normal per pt  She does not have a history of ALICIA 2, 3 or cervical cancer.   Last Mammogram: 3/8/2024 negative  Last colonoscopy: 3/8/2024 negative      Examination chaperoned by Eden Lees LPN.

## 2024-03-14 ENCOUNTER — OFFICE VISIT (OUTPATIENT)
Age: 44
End: 2024-03-14
Payer: MEDICAID

## 2024-03-14 VITALS — SYSTOLIC BLOOD PRESSURE: 101 MMHG | BODY MASS INDEX: 27.91 KG/M2 | DIASTOLIC BLOOD PRESSURE: 64 MMHG | WEIGHT: 178.2 LBS

## 2024-03-14 DIAGNOSIS — Z11.51 SCREENING FOR HUMAN PAPILLOMAVIRUS (HPV): ICD-10-CM

## 2024-03-14 DIAGNOSIS — Z01.419 ENCOUNTER FOR GYNECOLOGICAL EXAMINATION: Primary | ICD-10-CM

## 2024-03-14 PROCEDURE — 99386 PREV VISIT NEW AGE 40-64: CPT | Performed by: OBSTETRICS & GYNECOLOGY

## 2024-03-14 NOTE — PROGRESS NOTES
Poppy Lewis is a ,  44 y.o. female White (non-) whose LMP was on 3/9/2024 who presents for her annual checkup. She is having no problems.    Menstrual status:    Her periods are moderate in flow, regular    She denies dysmenorrhea.      Contraception:    The current method of family planning is tubal ligation.    Sexual history:    She  reports that she is not currently sexually active. She reports using the following method of birth control/protection: Surgical.        Pap and Mammogram History:    Last Pap:  was normal per pt  She does not have a history of ALICAI 2, 3 or cervical cancer.   Last Mammogram: 3/8/2024 negative  Last colonoscopy: never         Breast Cancer History    She has a family history of breast cancer.    Family History   Problem Relation Age of Onset    Hypertension Mother     Heart Disease Mother         CAD    Diabetes Mother     Other Father         CLL    Cancer Father         prostate    Hypertension Brother     Breast Cancer Paternal Grandmother        Past Medical History:   Diagnosis Date    Thyroid disease     hypo     Past Surgical History:   Procedure Laterality Date     SECTION      twins    TUBAL LIGATION       Current Outpatient Medications   Medication Sig Dispense Refill    famotidine (PEPCID) 40 MG tablet Take 1 tablet by mouth 2 times daily 60 tablet 5     No current facility-administered medications for this visit.     Allergies: Patient has no known allergies.   Tobacco History:  reports that she has never smoked. She has never used smokeless tobacco.  Alcohol Abuse:  reports no history of alcohol use.  Drug Abuse:  reports no history of drug use.  Patient Active Problem List   Diagnosis    Thyroid disease    Gastroesophageal reflux disease without esophagitis         Review of Systems - History obtained from the patient  Constitutional: negative for weight loss, fever, night sweats  HEENT: negative for hearing loss, earache, congestion,

## 2024-03-20 LAB
CYTOLOGIST CVX/VAG CYTO: NORMAL
CYTOLOGY CVX/VAG DOC CYTO: NORMAL
CYTOLOGY CVX/VAG DOC THIN PREP: NORMAL
DX ICD CODE: NORMAL
HPV GENOTYPE REFLEX: NORMAL
HPV I/H RISK 4 DNA CVX QL PROBE+SIG AMP: NEGATIVE
Lab: NORMAL
Lab: NORMAL
OTHER STN SPEC: NORMAL
STAT OF ADQ CVX/VAG CYTO-IMP: NORMAL

## 2024-03-29 ENCOUNTER — TELEPHONE (OUTPATIENT)
Age: 44
End: 2024-03-29

## 2024-03-29 NOTE — TELEPHONE ENCOUNTER
----- Message from Yessenia Mijares MD sent at 3/28/2024  8:13 PM EDT -----  Normal mammogram report.

## 2025-01-22 ENCOUNTER — COMMUNITY OUTREACH (OUTPATIENT)
Age: 45
End: 2025-01-22

## 2025-04-18 ENCOUNTER — OFFICE VISIT (OUTPATIENT)
Age: 45
End: 2025-04-18

## 2025-04-18 ENCOUNTER — LAB (OUTPATIENT)
Age: 45
End: 2025-04-18

## 2025-04-18 VITALS
HEART RATE: 68 BPM | BODY MASS INDEX: 27.34 KG/M2 | DIASTOLIC BLOOD PRESSURE: 68 MMHG | OXYGEN SATURATION: 98 % | RESPIRATION RATE: 16 BRPM | SYSTOLIC BLOOD PRESSURE: 110 MMHG | TEMPERATURE: 98.5 F | HEIGHT: 67 IN | WEIGHT: 174.2 LBS

## 2025-04-18 DIAGNOSIS — R53.83 FATIGUE, UNSPECIFIED TYPE: ICD-10-CM

## 2025-04-18 DIAGNOSIS — Z11.3 SCREEN FOR STD (SEXUALLY TRANSMITTED DISEASE): ICD-10-CM

## 2025-04-18 DIAGNOSIS — Z11.4 SCREENING FOR HIV (HUMAN IMMUNODEFICIENCY VIRUS): ICD-10-CM

## 2025-04-18 DIAGNOSIS — Z11.59 ENCOUNTER FOR HEPATITIS C SCREENING TEST FOR LOW RISK PATIENT: ICD-10-CM

## 2025-04-18 DIAGNOSIS — Z12.11 SCREEN FOR COLON CANCER: ICD-10-CM

## 2025-04-18 DIAGNOSIS — Z12.31 ENCOUNTER FOR SCREENING MAMMOGRAM FOR BREAST CANCER: ICD-10-CM

## 2025-04-18 DIAGNOSIS — E55.9 VITAMIN D DEFICIENCY: ICD-10-CM

## 2025-04-18 DIAGNOSIS — E04.9 GOITER: ICD-10-CM

## 2025-04-18 DIAGNOSIS — Z00.00 ENCOUNTER FOR WELL ADULT EXAM WITHOUT ABNORMAL FINDINGS: Primary | ICD-10-CM

## 2025-04-18 DIAGNOSIS — H61.23 BILATERAL IMPACTED CERUMEN: ICD-10-CM

## 2025-04-18 DIAGNOSIS — Z00.00 ENCOUNTER FOR WELL ADULT EXAM WITHOUT ABNORMAL FINDINGS: ICD-10-CM

## 2025-04-18 SDOH — ECONOMIC STABILITY: FOOD INSECURITY: WITHIN THE PAST 12 MONTHS, YOU WORRIED THAT YOUR FOOD WOULD RUN OUT BEFORE YOU GOT MONEY TO BUY MORE.: NEVER TRUE

## 2025-04-18 SDOH — ECONOMIC STABILITY: FOOD INSECURITY: WITHIN THE PAST 12 MONTHS, THE FOOD YOU BOUGHT JUST DIDN'T LAST AND YOU DIDN'T HAVE MONEY TO GET MORE.: NEVER TRUE

## 2025-04-18 ASSESSMENT — PATIENT HEALTH QUESTIONNAIRE - PHQ9
9. THOUGHTS THAT YOU WOULD BE BETTER OFF DEAD, OR OF HURTING YOURSELF: NOT AT ALL
SUM OF ALL RESPONSES TO PHQ QUESTIONS 1-9: 0
5. POOR APPETITE OR OVEREATING: NOT AT ALL
SUM OF ALL RESPONSES TO PHQ QUESTIONS 1-9: 0
1. LITTLE INTEREST OR PLEASURE IN DOING THINGS: NOT AT ALL
10. IF YOU CHECKED OFF ANY PROBLEMS, HOW DIFFICULT HAVE THESE PROBLEMS MADE IT FOR YOU TO DO YOUR WORK, TAKE CARE OF THINGS AT HOME, OR GET ALONG WITH OTHER PEOPLE: NOT DIFFICULT AT ALL
3. TROUBLE FALLING OR STAYING ASLEEP: NOT AT ALL
SUM OF ALL RESPONSES TO PHQ QUESTIONS 1-9: 0
7. TROUBLE CONCENTRATING ON THINGS, SUCH AS READING THE NEWSPAPER OR WATCHING TELEVISION: NOT AT ALL
8. MOVING OR SPEAKING SO SLOWLY THAT OTHER PEOPLE COULD HAVE NOTICED. OR THE OPPOSITE, BEING SO FIGETY OR RESTLESS THAT YOU HAVE BEEN MOVING AROUND A LOT MORE THAN USUAL: NOT AT ALL
4. FEELING TIRED OR HAVING LITTLE ENERGY: NOT AT ALL
2. FEELING DOWN, DEPRESSED OR HOPELESS: NOT AT ALL
SUM OF ALL RESPONSES TO PHQ QUESTIONS 1-9: 0
6. FEELING BAD ABOUT YOURSELF - OR THAT YOU ARE A FAILURE OR HAVE LET YOURSELF OR YOUR FAMILY DOWN: NOT AT ALL

## 2025-04-18 ASSESSMENT — ENCOUNTER SYMPTOMS: CONSTIPATION: 1

## 2025-04-18 NOTE — PROGRESS NOTES
Identified pt with two pt identifiers(name and )    Chief Complaint   Patient presents with    Annual Exam     No concerns        Health Maintenance Due   Topic    Hepatitis B vaccine (1 of 3 - 19+ 3-dose series)    DTaP/Tdap/Td vaccine (1 - Tdap)    Colorectal Cancer Screen     COVID-19 Vaccine ( season)    Depression Screen        Wt Readings from Last 3 Encounters:   25 79 kg (174 lb 3.2 oz)   24 80.8 kg (178 lb 3.2 oz)   24 81.2 kg (179 lb)     Temp Readings from Last 3 Encounters:   25 98.5 °F (36.9 °C) (Temporal)   24 97.6 °F (36.4 °C) (Temporal)     BP Readings from Last 3 Encounters:   25 110/68   24 101/64   24 110/62     Pulse Readings from Last 3 Encounters:   25 68   24 66   22 77           Depression Screening:  :         2025     2:00 PM 2024     3:18 PM 2022     4:00 PM   PHQ-9 Questionaire   Little interest or pleasure in doing things 0 0 0   Feeling down, depressed, or hopeless 0 0 0   Trouble falling or staying asleep, or sleeping too much 0 0    Feeling tired or having little energy 0 0    Poor appetite or overeating 0 0    Feeling bad about yourself - or that you are a failure or have let yourself or your family down 0 0    Trouble concentrating on things, such as reading the newspaper or watching television 0 0    Moving or speaking so slowly that other people could have noticed. Or the opposite - being so fidgety or restless that you have been moving around a lot more than usual 0 0    Thoughts that you would be better off dead, or of hurting yourself in some way 0 0    PHQ-9 Total Score 0 0 0   If you checked off any problems, how difficult have these problems made it for you to do your work, take care of things at home, or get along with other people? 0 0         Fall Risk Assessment:  :          No data to display                 Abuse Screening:  :          No data to display

## 2025-04-18 NOTE — PROGRESS NOTES
Well Adult Note  Name: Poppy Lewis Today’s Date: 2025   MRN: [id number] Sex: Female   Age: 45 y.o. Ethnicity:  /    : 1980 Race: White (non-)      Poppy Lewis is here for a well adult exam.       Assessment & Plan   Encounter for well adult exam without abnormal findings  -     Comprehensive Metabolic Panel; Future  -     CBC; Future  -     TSH; Future  -     Iron and TIBC; Future  -     T4, Free; Future  -     Vitamin B12; Future  Screen for colon cancer  -     [facility] - Miguelito Patel [facility], [facility]  Vitamin D deficiency  -     Vitamin D 25 Hydroxy; Future  Goiter  -     TSH; Future  -     T4, Free; Future  -     US THYROID; Future  Fatigue, unspecified type  -     Vitamin B12; Future  Encounter for screening mammogram for breast cancer  -     PAM URBANO DIGITAL SCREEN BILATERAL; Future  Bilateral impacted cerumen  -     REMOVAL IMPACTED CERUMEN IRRIGATION/LVG UNILAT  Screening for HIV (human immunodeficiency virus)  -     HIV 1/2 Ag/Ab, 4TH Generation,W Rflx Confirm; Future  Encounter for hepatitis C screening test for low risk patient  -     Hepatitis C Antibody; Future  Screen for STD (sexually transmitted disease)  -     RPR; Future      Pt's ear canal irrigated with warm water and hydrogen peroxide solution.  Patient tolerated procedure well. A lot of wax remains in both canals. Pt instructed to use sweet oil 2 drops in each ear QHS to liquify ear wax.    No follow-ups on file.       Subjective   History:  CPE. Request STI screening.   Fatigue.  Chronic constipation. Takes Smooth Move tea daily.      Review of Systems   Constitutional:  Positive for fatigue.   Gastrointestinal:  Positive for constipation.       No Known Allergies  Prior to Visit Medications    Medication Sig Taking? Authorizing Provider   famotidine (PEPCID) 40 MG tablet Take 1 tablet by mouth 2 times daily  Patient not taking: Reported on 2025  Yessenia Mijares MD

## 2025-04-19 ENCOUNTER — RESULTS FOLLOW-UP (OUTPATIENT)
Age: 45
End: 2025-04-19

## 2025-04-19 LAB
25(OH)D3+25(OH)D2 SERPL-MCNC: 25.1 NG/ML (ref 30–100)
ALBUMIN SERPL-MCNC: 4 G/DL (ref 3.9–4.9)
ALP SERPL-CCNC: 70 IU/L (ref 44–121)
ALT SERPL-CCNC: 11 IU/L (ref 0–32)
AST SERPL-CCNC: 14 IU/L (ref 0–40)
BILIRUB SERPL-MCNC: 0.6 MG/DL (ref 0–1.2)
BUN SERPL-MCNC: 17 MG/DL (ref 6–24)
BUN/CREAT SERPL: 27 (ref 9–23)
CALCIUM SERPL-MCNC: 8.8 MG/DL (ref 8.7–10.2)
CHLORIDE SERPL-SCNC: 102 MMOL/L (ref 96–106)
CO2 SERPL-SCNC: 24 MMOL/L (ref 20–29)
CREAT SERPL-MCNC: 0.63 MG/DL (ref 0.57–1)
EGFRCR SERPLBLD CKD-EPI 2021: 110 ML/MIN/1.73
ERYTHROCYTE [DISTWIDTH] IN BLOOD BY AUTOMATED COUNT: 12.8 % (ref 11.7–15.4)
GLOBULIN SER CALC-MCNC: 2.8 G/DL (ref 1.5–4.5)
GLUCOSE SERPL-MCNC: 72 MG/DL (ref 70–99)
HCT VFR BLD AUTO: 34 % (ref 34–46.6)
HCV IGG SERPL QL IA: NON REACTIVE
HGB BLD-MCNC: 10.6 G/DL (ref 11.1–15.9)
HIV 1+2 AB+HIV1 P24 AG SERPL QL IA: NON REACTIVE
IRON SATN MFR SERPL: 7 % (ref 15–55)
IRON SERPL-MCNC: 30 UG/DL (ref 27–159)
MCH RBC QN AUTO: 26.1 PG (ref 26.6–33)
MCHC RBC AUTO-ENTMCNC: 31.2 G/DL (ref 31.5–35.7)
MCV RBC AUTO: 84 FL (ref 79–97)
PLATELET # BLD AUTO: 316 X10E3/UL (ref 150–450)
POTASSIUM SERPL-SCNC: 4.2 MMOL/L (ref 3.5–5.2)
PROT SERPL-MCNC: 6.8 G/DL (ref 6–8.5)
RBC # BLD AUTO: 4.06 X10E6/UL (ref 3.77–5.28)
RPR SER QL: NON REACTIVE
SODIUM SERPL-SCNC: 138 MMOL/L (ref 134–144)
T4 FREE SERPL-MCNC: 1.13 NG/DL (ref 0.82–1.77)
TIBC SERPL-MCNC: 414 UG/DL (ref 250–450)
TSH SERPL DL<=0.005 MIU/L-ACNC: 0.91 UIU/ML (ref 0.45–4.5)
UIBC SERPL-MCNC: 384 UG/DL (ref 131–425)
VIT B12 SERPL-MCNC: 422 PG/ML (ref 232–1245)
WBC # BLD AUTO: 8.3 X10E3/UL (ref 3.4–10.8)

## 2025-04-19 NOTE — RESULT ENCOUNTER NOTE
Lab results show anemia with low hemoglobin.  Iron saturations are low.  Recommend take multivitamin with iron daily.  Let us plan to recheck labs in 3 months.  Also vitamin D level is low.  Recommend add vitamin D3 1000 IU tablet daily.  This should help with improving energy level and fatigue.  Normal vitamin B12.  Normal kidney and liver function tests.  Normal thyroid tests.  Negative screening for HIV, hepatitis C, and syphilis.

## 2025-04-21 NOTE — TELEPHONE ENCOUNTER
----- Message from Dr. Yessenia Mijares MD sent at 4/19/2025 10:04 AM EDT -----  Lab results show anemia with low hemoglobin.  Iron saturations are low.  Recommend take multivitamin with iron daily.  Let us plan to recheck labs in 3 months.  Also vitamin D level is low.  Recommend add vitamin D3 1000 IU tablet daily.  This should help with improving energy level and fatigue.  Normal vitamin B12.  Normal kidney and liver function tests.  Normal thyroid tests.  Negative screening for HIV, hepatitis C, and syphilis.

## 2025-04-22 NOTE — TELEPHONE ENCOUNTER
L/V to return call to office for lab results. Patient has seen results on mychart. Sent TBS message with results.

## 2025-05-08 ENCOUNTER — HOSPITAL ENCOUNTER (OUTPATIENT)
Facility: HOSPITAL | Age: 45
Discharge: HOME | End: 2025-05-11
Attending: FAMILY MEDICINE
Payer: COMMERCIAL

## 2025-05-08 DIAGNOSIS — Z12.31 ENCOUNTER FOR SCREENING MAMMOGRAM FOR BREAST CANCER: ICD-10-CM

## 2025-05-08 DIAGNOSIS — E04.9 GOITER: ICD-10-CM

## 2025-05-08 PROCEDURE — 77063 BREAST TOMOSYNTHESIS BI: CPT

## 2025-05-08 PROCEDURE — 76536 US EXAM OF HEAD AND NECK: CPT
